# Patient Record
Sex: MALE | Race: ASIAN | URBAN - METROPOLITAN AREA
[De-identification: names, ages, dates, MRNs, and addresses within clinical notes are randomized per-mention and may not be internally consistent; named-entity substitution may affect disease eponyms.]

---

## 2017-05-22 ENCOUNTER — OFFICE VISIT (OUTPATIENT)
Dept: OPHTHALMOLOGY | Facility: CLINIC | Age: 67
End: 2017-05-22

## 2017-05-22 DIAGNOSIS — H52.13 MYOPIA, BILATERAL: Primary | ICD-10-CM

## 2017-05-22 DIAGNOSIS — H02.889 MEIBOMIAN GLAND DYSFUNCTION (MGD): ICD-10-CM

## 2017-05-22 DIAGNOSIS — H43.393 VITREOUS SYNERESIS, BILATERAL: ICD-10-CM

## 2017-05-22 DIAGNOSIS — H25.13 SENILE NUCLEAR SCLEROSIS, BILATERAL: ICD-10-CM

## 2017-05-22 DIAGNOSIS — H02.836 DERMATOCHALASIS OF EYELID OF LEFT EYE: ICD-10-CM

## 2017-05-22 RX ORDER — ATORVASTATIN CALCIUM 10 MG/1
10 TABLET, FILM COATED ORAL DAILY
COMMUNITY

## 2017-05-22 ASSESSMENT — VISUAL ACUITY
OS_CC+: -
CORRECTION_TYPE: GLASSES
OD_CC+: -1
OD_CC: 20/20
METHOD: SNELLEN - LINEAR
OS_CC: 20/20

## 2017-05-22 ASSESSMENT — REFRACTION_WEARINGRX
OS_AXIS: 090
OS_CYLINDER: +0.25
OS_ADD: +2.50
OD_AXIS: 175
OD_CYLINDER: +1.00
SPECS_TYPE: PAL
OD_SPHERE: -4.50
OS_SPHERE: -4.50
OD_ADD: +2.50

## 2017-05-22 ASSESSMENT — REFRACTION_MANIFEST
OS_AXIS: 090
OD_ADD: +2.50
OD_SPHERE: -4.25
OS_SPHERE: -4.50
OS_CYLINDER: +0.25
OD_CYLINDER: +1.00
OD_AXIS: 165
OS_ADD: +2.50

## 2017-05-22 ASSESSMENT — CUP TO DISC RATIO
OD_RATIO: 0.35
OS_RATIO: 0.3

## 2017-05-22 ASSESSMENT — CONF VISUAL FIELD
OD_NORMAL: 1
OS_NORMAL: 1
METHOD: COUNTING FINGERS

## 2017-05-22 ASSESSMENT — SLIT LAMP EXAM - LIDS: COMMENTS: 1+ MGD

## 2017-05-22 ASSESSMENT — TONOMETRY
IOP_METHOD: TONOPEN
OS_IOP_MMHG: 14
OD_IOP_MMHG: 12

## 2017-05-22 ASSESSMENT — EXTERNAL EXAM - RIGHT EYE: OD_EXAM: NORMAL

## 2017-05-22 ASSESSMENT — EXTERNAL EXAM - LEFT EYE: OS_EXAM: NORMAL

## 2017-05-22 NOTE — MR AVS SNAPSHOT
After Visit Summary   5/22/2017    Kike Aguilar    MRN: [id number]           Patient Information     Date Of Birth          1950        Visit Information        Provider Department      5/22/2017 1:20 PM Salty Noyola OD [facility]        Today's Diagnoses     Myopia, bilateral    -  1    Senile nuclear sclerosis, bilateral        Vitreous syneresis, bilateral        Meibomian gland dysfunction (MGD)        Dermatochalasis of eyelid of left eye           Follow-ups after your visit        Follow-up notes from your care team     Return in about 1 year (around 5/22/2018) for Annual Visit.      Who to contact     Please call your clinic at [phone] to:    Ask questions about your health    Make or cancel appointments    Discuss your medicines    Learn about your test results    Speak to your doctor   If you have compliments or concerns about an experience at your clinic, or if you wish to file a complaint, please contact [facility] Patient Relations at [phone] or email us at [email or web address]         Additional Information About Your Visit        MyChart Information     [employer] gives you secure access to your electronic health record. If you see a primary care provider, you can also send messages to your care team and make appointments. If you have questions, please call your primary care clinic.  If you do not have a primary care provider, please call [phone] and they will assist you.      [facility] is an electronic gateway that provides easy, online access to your medical records. With [facility], you can request a clinic appointment, read your test results, renew a prescription or communicate with your care team.     To access your existing account, please contact your [facility] or call [phone] for assistance.        Care EveryWhere ID     This is your Care EveryWhere ID. This  could be used by other organizations to access your [facility] medical records  [id number]         Blood Pressure from Last 3 Encounters:   No data found for BP    Weight from Last 3 Encounters:   No data found for Wt              We Performed the Following     Refraction        Primary Care Provider    None Specified       No primary provider on file.        Thank you!     Thank you for choosing [facility]  for your care. Our goal is always to provide you with excellent care. Hearing back from our patients is one way we can continue to improve our services. Please take a few minutes to complete the written survey that you may receive in the mail after your visit with us. Thank you!             Your Updated Medication List - Protect others around you: Learn how to safely use, store and throw away your medicines at www.disposemymeds.org.          This list is accurate as of: 5/22/17  3:07 PM.  Always use your most recent med list.                   Brand Name Dispense Instructions for use    ASPIRIN PO          atorvastatin 10 MG tablet    LIPITOR     Take 10 mg by mouth daily       IBUPROFEN PO

## 2017-05-22 NOTE — PROGRESS NOTES
Assessment/Plan  (H52.13) Myopia, bilateral  (primary encounter diagnosis)  Comment: Compound myopic astigmatism with presbyopia OU  Plan: Refraction   Educated patient on condition and clinical findings. Dispensed spectacle prescription for full time wear.    (H25.13) Senile nuclear sclerosis, bilateral  Comment: Not visually significant.  Plan:  No treatment indicated. Monitor annually.    (H43.393) Vitreous syneresis, bilateral  Comment: Longstanding per patient  Plan:  Educated patient on signs and symptoms of retinal detachment including increase in flashes, floaters, or a change in vision. If symptoms present, return to clinic immediately.    (H02.89) Meibomian gland dysfunction (MGD)  Comment: Asymptomatic  Plan:  Recommended warm compresses as needed. Monitor annually, or sooner if symptoms worsen.    (H02.836) Dermatochalasis of eyelid of left eye  Comment: Asymptomatic  Plan:  No treatment indicated at this time. Monitor.      Complete documentation of historical and exam elements from today's encounter can  be found in the full encounter summary report (not reduplicated in this progress  note). I personally obtained the chief complaint(s) and history of present illness. I  confirmed and edited as necessary the review of systems, past medical/surgical  history, family history, social history, and examination findings as documented by  others; and I examined the patient myself. I personally reviewed the relevant tests,  images, and reports as documented above. I formulated and edited as necessary the  assessment and plan and discussed the findings and management plan with the  patient and family.    Salty Noyola, OD, FAAO

## 2017-05-22 NOTE — NURSING NOTE
Chief Complaints and History of Present Illnesses   Patient presents with     COMPREHENSIVE EYE EXAM     2 year return for DFE     HPI    Affected eye(s):  Both   Symptoms:     No decreased vision   No glare   No halos   No starbursts      Duration:  2 years   Frequency:  Constant       Do you have eye pain now?:  No      Comments:  2 year return for DFE.  Pt will be purchasing new glasses; otherwise no [facility] concerns.    Izzy RODRIGUEZ 1:52 PM 05/22/2017

## 2019-02-05 ENCOUNTER — OFFICE VISIT (OUTPATIENT)
Dept: OPHTHALMOLOGY | Facility: CLINIC | Age: 69
End: 2019-02-05
Payer: MEDICARE

## 2019-02-05 DIAGNOSIS — H52.13 MYOPIC ASTIGMATISM OF BOTH EYES: ICD-10-CM

## 2019-02-05 DIAGNOSIS — H25.13 NUCLEAR SCLEROTIC CATARACT OF BOTH EYES: Primary | ICD-10-CM

## 2019-02-05 DIAGNOSIS — H52.4 PRESBYOPIA: ICD-10-CM

## 2019-02-05 DIAGNOSIS — H52.203 MYOPIC ASTIGMATISM OF BOTH EYES: ICD-10-CM

## 2019-02-05 RX ORDER — CETIRIZINE HYDROCHLORIDE 10 MG/1
10 TABLET ORAL
COMMUNITY

## 2019-02-05 ASSESSMENT — CONF VISUAL FIELD
OD_NORMAL: 1
OS_NORMAL: 1

## 2019-02-05 ASSESSMENT — REFRACTION_MANIFEST
OS_AXIS: 005
OS_SPHERE: -4.25
OD_CYLINDER: +1.00
OS_CYLINDER: +0.25
OD_SPHERE: -4.00
OD_AXIS: 160
OS_ADD: +2.50
OD_ADD: +2.50

## 2019-02-05 ASSESSMENT — REFRACTION_WEARINGRX
OS_CYLINDER: +0.25
OS_AXIS: 090
OD_ADD: +2.50
OS_SPHERE: -4.50
SPECS_TYPE: PAL
OS_ADD: +2.50
OD_SPHERE: -4.50
OD_AXIS: 175
OD_CYLINDER: +1.00

## 2019-02-05 ASSESSMENT — CUP TO DISC RATIO
OD_RATIO: 0.35
OS_RATIO: 0.3

## 2019-02-05 ASSESSMENT — VISUAL ACUITY
METHOD: SNELLEN - LINEAR
OD_CC: 20/20-
OS_CC: 20/20-
CORRECTION_TYPE: GLASSES

## 2019-02-05 ASSESSMENT — EXTERNAL EXAM - RIGHT EYE: OD_EXAM: NORMAL

## 2019-02-05 ASSESSMENT — TONOMETRY
OD_IOP_MMHG: 13
OS_IOP_MMHG: 13
IOP_METHOD: TONOPEN

## 2019-02-05 ASSESSMENT — EXTERNAL EXAM - LEFT EYE: OS_EXAM: NORMAL

## 2019-02-05 ASSESSMENT — SLIT LAMP EXAM - LIDS: COMMENTS: 1+ MGD

## 2019-02-05 NOTE — PROGRESS NOTES
HPI  Kike Aguilar is a 68 year old male here for full eye exam. Last visit was with Dr. Noyola in 2017. He feels his vision has been overall good at distance with current glasses, but he notes he has always seen better taking his glasses off to read than he has with a progressive lens. His current frames are also bent. No pain, redness, discharge. No flashes.    Assessment & Plan      (H25.13) Nuclear sclerotic cataract of both eyes  (primary encounter diagnosis)  Comment: Mild, not visually significant  Plan: Observe    (H52.203,  H52.13) Myopic astigmatism of both eyes  (H52.4) Presbyopia  Comment: Good vision with refraction  Plan: Given updated glasses Rx.   -----------------------------------------------------------------------------------    Patient disposition:   Return in about 1 year (around 2/5/2020). or sooner as needed.    Teaching statement:  Complete documentation of historical and exam elements from today's encounter can be found in the full encounter summary report (not reduplicated in this progress note). I personally obtained the chief complaint(s) and history of present illness.  I confirmed and edited as necessary the review of systems, past medical/surgical history, family history, social history, and examination findings as documented by others; and I examined the patient myself. I personally reviewed the relevant tests, images, and reports as documented above.     I formulated and edited as necessary the assessment and plan and discussed the findings and management plan with the patient and family.    Cynthia Brito MD  Comprehensive Ophthalmology & Ocular Pathology  Department of Ophthalmology and Visual Neurosciences  [email or web address]  Pager [phone]

## 2020-03-10 ENCOUNTER — HEALTH MAINTENANCE LETTER (OUTPATIENT)
Age: 70
End: 2020-03-10

## 2020-12-20 ENCOUNTER — HEALTH MAINTENANCE LETTER (OUTPATIENT)
Age: 70
End: 2020-12-20

## 2021-04-24 ENCOUNTER — HEALTH MAINTENANCE LETTER (OUTPATIENT)
Age: 71
End: 2021-04-24

## 2021-10-03 ENCOUNTER — HEALTH MAINTENANCE LETTER (OUTPATIENT)
Age: 71
End: 2021-10-03

## 2022-05-15 ENCOUNTER — HEALTH MAINTENANCE LETTER (OUTPATIENT)
Age: 72
End: 2022-05-15

## 2022-09-11 ENCOUNTER — HEALTH MAINTENANCE LETTER (OUTPATIENT)
Age: 72
End: 2022-09-11

## 2023-06-03 ENCOUNTER — HEALTH MAINTENANCE LETTER (OUTPATIENT)
Age: 73
End: 2023-06-03